# Patient Record
Sex: MALE | Race: OTHER | Employment: UNEMPLOYED | URBAN - METROPOLITAN AREA
[De-identification: names, ages, dates, MRNs, and addresses within clinical notes are randomized per-mention and may not be internally consistent; named-entity substitution may affect disease eponyms.]

---

## 2018-01-13 ENCOUNTER — OFFICE VISIT (OUTPATIENT)
Dept: INTERNAL MEDICINE CLINIC | Facility: CLINIC | Age: 23
End: 2018-01-13

## 2018-01-13 ENCOUNTER — TELEPHONE (OUTPATIENT)
Dept: INTERNAL MEDICINE CLINIC | Facility: CLINIC | Age: 23
End: 2018-01-13

## 2018-01-13 VITALS
DIASTOLIC BLOOD PRESSURE: 80 MMHG | WEIGHT: 191.5 LBS | RESPIRATION RATE: 22 BRPM | TEMPERATURE: 98 F | BODY MASS INDEX: 28.04 KG/M2 | HEIGHT: 69.25 IN | HEART RATE: 98 BPM | SYSTOLIC BLOOD PRESSURE: 124 MMHG

## 2018-01-13 DIAGNOSIS — Z00.00 ROUTINE GENERAL MEDICAL EXAMINATION AT A HEALTH CARE FACILITY: Primary | ICD-10-CM

## 2018-01-13 PROBLEM — F84.0 AUTISM SPECTRUM DISORDER: Status: ACTIVE | Noted: 2018-01-13

## 2018-01-13 PROBLEM — F90.2 ATTENTION DEFICIT HYPERACTIVITY DISORDER (ADHD), COMBINED TYPE: Status: ACTIVE | Noted: 2018-01-13

## 2018-01-13 PROBLEM — G24.9 DYSTONIA: Status: ACTIVE | Noted: 2018-01-13

## 2018-01-13 PROBLEM — E66.3 OVERWEIGHT (BMI 25.0-29.9): Status: ACTIVE | Noted: 2018-01-13

## 2018-01-13 PROBLEM — F84.0 AUTISM SPECTRUM DISORDER (HCC): Status: ACTIVE | Noted: 2018-01-13

## 2018-01-13 PROCEDURE — 99385 PREV VISIT NEW AGE 18-39: CPT | Performed by: INTERNAL MEDICINE

## 2018-01-13 RX ORDER — RISPERIDONE 2 MG/1
TABLET, FILM COATED ORAL
Refills: 6 | COMMUNITY
Start: 2017-12-10 | End: 2018-11-06 | Stop reason: ALTCHOICE

## 2018-01-13 RX ORDER — RISPERIDONE 3 MG/1
TABLET, FILM COATED ORAL
Refills: 6 | COMMUNITY
Start: 2017-12-10 | End: 2018-05-26

## 2018-01-13 RX ORDER — BENZTROPINE MESYLATE 1 MG/1
TABLET ORAL
Refills: 11 | COMMUNITY
Start: 2017-12-10 | End: 2018-05-26

## 2018-01-13 RX ORDER — DEXTROAMPHETAMINE SACCHARATE, AMPHETAMINE ASPARTATE MONOHYDRATE, DEXTROAMPHETAMINE SULFATE AND AMPHETAMINE SULFATE 3.75; 3.75; 3.75; 3.75 MG/1; MG/1; MG/1; MG/1
CAPSULE, EXTENDED RELEASE ORAL
Refills: 0 | COMMUNITY
Start: 2017-12-09 | End: 2018-04-04

## 2018-01-13 RX ORDER — CLONIDINE HYDROCHLORIDE 0.3 MG/1
0.3 TABLET ORAL
Refills: 6 | COMMUNITY
Start: 2017-12-10 | End: 2018-05-26

## 2018-01-13 NOTE — PROGRESS NOTES
Raimundo Srivastava is a 23year old male. HPI:   Patient presents for physical exam. Comes in with mother, twin borther, and maternal grandmother who is guardian. 1. ADHD: he feels he is focusing well.  Doing well with [employer] program.   2. Behavioral Disruptio pleasant  EXTREMITIES: no cyanosis, clubbing or edema    ASSESSMENT AND PLAN:   # ADHD: doing well on adderall  # Autism Spectrum Disorder: doing well, participating in [employer] program  # Insomnia: well controlled on nightly clonidine  # Behavioral Tery Norma

## 2018-01-13 NOTE — TELEPHONE ENCOUNTER
Forms were faxed to dr.ali dyer at [address] [place] [address] at [phone]     [facility] ([place], [place]) [phone]

## 2018-01-13 NOTE — PATIENT INSTRUCTIONS
You need 3 eight ounce servings of calcium/vitamin D daily. This includes spinach, kale, broccoli, almonds, milk, yogurt, or cottage cheese.

## 2018-01-18 ENCOUNTER — TELEPHONE (OUTPATIENT)
Dept: INTERNAL MEDICINE CLINIC | Facility: CLINIC | Age: 23
End: 2018-01-18

## 2018-01-18 NOTE — TELEPHONE ENCOUNTER
Received faxed records from [facility] - gave to LOUIS simms for dr

## 2018-01-29 ENCOUNTER — TELEPHONE (OUTPATIENT)
Dept: INTERNAL MEDICINE CLINIC | Facility: CLINIC | Age: 23
End: 2018-01-29

## 2018-01-29 NOTE — TELEPHONE ENCOUNTER
Medical records for [facility] have been received From [facility] and given to Dr. Sarah Enriquez

## 2018-04-04 ENCOUNTER — OFFICE VISIT (OUTPATIENT)
Dept: INTERNAL MEDICINE CLINIC | Facility: CLINIC | Age: 23
End: 2018-04-04

## 2018-04-04 VITALS
BODY MASS INDEX: 28.41 KG/M2 | WEIGHT: 194 LBS | SYSTOLIC BLOOD PRESSURE: 115 MMHG | OXYGEN SATURATION: 96 % | DIASTOLIC BLOOD PRESSURE: 80 MMHG | TEMPERATURE: 98 F | HEIGHT: 69.25 IN | HEART RATE: 86 BPM | RESPIRATION RATE: 15 BRPM

## 2018-04-04 DIAGNOSIS — F90.2 ATTENTION DEFICIT HYPERACTIVITY DISORDER (ADHD), COMBINED TYPE: ICD-10-CM

## 2018-04-04 DIAGNOSIS — F84.0 AUTISM SPECTRUM DISORDER: ICD-10-CM

## 2018-04-04 DIAGNOSIS — E66.3 OVERWEIGHT (BMI 25.0-29.9): Primary | ICD-10-CM

## 2018-04-04 PROCEDURE — 99213 OFFICE O/P EST LOW 20 MIN: CPT | Performed by: INTERNAL MEDICINE

## 2018-04-04 RX ORDER — DEXTROAMPHETAMINE SACCHARATE, AMPHETAMINE ASPARTATE MONOHYDRATE, DEXTROAMPHETAMINE SULFATE AND AMPHETAMINE SULFATE 3.75; 3.75; 3.75; 3.75 MG/1; MG/1; MG/1; MG/1
CAPSULE, EXTENDED RELEASE ORAL
Qty: 30 CAPSULE | Refills: 0 | Status: SHIPPED | OUTPATIENT
Start: 2018-04-04 | End: 2018-04-17

## 2018-04-04 NOTE — PROGRESS NOTES
Marian Primrose is a 23year old male. HPI:   Patient presents for medication refill. Comes in with twin borther, and maternal grandmother who is guardian. 1. ADHD: he feels he is focusing well.  Doing well with [employer] program. Has appt with psychiatry on A (Oral)   Resp 15   Ht 69.25\"   Wt 194 lb   SpO2 96%   BMI 28.44 kg/m²   GENERAL: A&O well developed, well nourished, in no apparent distress  SKIN: no rashes, no suspicious lesions  HEENT: atraumatic, MMM, throat is clear  NECK: supple, no jvd, no thyrome

## 2018-05-21 ENCOUNTER — EKG ENCOUNTER (OUTPATIENT)
Dept: LAB | Age: 23
End: 2018-05-21
Attending: NURSE PRACTITIONER
Payer: COMMERCIAL

## 2018-05-21 ENCOUNTER — APPOINTMENT (OUTPATIENT)
Dept: LAB | Age: 23
End: 2018-05-21
Attending: NURSE PRACTITIONER
Payer: COMMERCIAL

## 2018-05-21 DIAGNOSIS — Z51.81 MEDICATION MONITORING ENCOUNTER: ICD-10-CM

## 2018-05-21 PROCEDURE — 80053 COMPREHEN METABOLIC PANEL: CPT

## 2018-05-21 PROCEDURE — 80061 LIPID PANEL: CPT

## 2018-05-21 PROCEDURE — 93005 ELECTROCARDIOGRAM TRACING: CPT

## 2018-05-21 PROCEDURE — 85025 COMPLETE CBC W/AUTO DIFF WBC: CPT

## 2018-05-21 PROCEDURE — 93010 ELECTROCARDIOGRAM REPORT: CPT | Performed by: INTERNAL MEDICINE

## 2018-05-21 PROCEDURE — 36415 COLL VENOUS BLD VENIPUNCTURE: CPT

## 2018-05-22 NOTE — PROGRESS NOTES
CBC, CMP, Lipid panel, and EKG were reviewed and WNL. Results will be reviewed with patient and his guardian upon next appointment.

## 2018-10-15 ENCOUNTER — TELEPHONE (OUTPATIENT)
Dept: INTERNAL MEDICINE CLINIC | Facility: CLINIC | Age: 23
End: 2018-10-15

## 2018-10-15 NOTE — TELEPHONE ENCOUNTER
Patient's grandmother calling to speak for nurse please callback regarding questions about paperwork she would like to drop off.  Please callback to discuss further

## 2018-11-06 PROBLEM — F41.9 ANXIETY: Status: ACTIVE | Noted: 2018-11-06

## 2018-12-18 ENCOUNTER — IMMUNIZATION (OUTPATIENT)
Dept: INTERNAL MEDICINE CLINIC | Facility: CLINIC | Age: 23
End: 2018-12-18
Payer: COMMERCIAL

## 2018-12-18 DIAGNOSIS — Z23 NEED FOR VACCINATION: ICD-10-CM

## 2018-12-18 PROCEDURE — 90686 IIV4 VACC NO PRSV 0.5 ML IM: CPT | Performed by: INTERNAL MEDICINE

## 2018-12-18 PROCEDURE — 90471 IMMUNIZATION ADMIN: CPT | Performed by: INTERNAL MEDICINE

## 2019-05-23 ENCOUNTER — LAB ENCOUNTER (OUTPATIENT)
Dept: LAB | Age: 24
End: 2019-05-23
Attending: NURSE PRACTITIONER
Payer: COMMERCIAL

## 2019-05-23 DIAGNOSIS — F84.0 AUTISM SPECTRUM DISORDER: ICD-10-CM

## 2019-05-23 PROCEDURE — 36415 COLL VENOUS BLD VENIPUNCTURE: CPT

## 2019-05-23 PROCEDURE — 84443 ASSAY THYROID STIM HORMONE: CPT

## 2019-05-23 PROCEDURE — 80053 COMPREHEN METABOLIC PANEL: CPT

## 2019-05-23 PROCEDURE — 85025 COMPLETE CBC W/AUTO DIFF WBC: CPT

## 2019-05-23 PROCEDURE — 82306 VITAMIN D 25 HYDROXY: CPT

## 2019-05-23 PROCEDURE — 80061 LIPID PANEL: CPT

## 2019-05-23 PROCEDURE — 84439 ASSAY OF FREE THYROXINE: CPT

## 2019-05-23 NOTE — PROGRESS NOTES
Results reviewed. Raymond, please inform patient or his mother (he has autism) his vitamin D is significantly low and that may be the cause of his tiredness. I will send in a prescription strength of weekly vitamin D for him.

## 2019-10-28 ENCOUNTER — PATIENT MESSAGE (OUTPATIENT)
Dept: INTERNAL MEDICINE CLINIC | Facility: CLINIC | Age: 24
End: 2019-10-28

## 2019-10-28 DIAGNOSIS — T78.40XD ALLERGIC STATE, SUBSEQUENT ENCOUNTER: Primary | ICD-10-CM

## 2019-10-29 RX ORDER — EPINEPHRINE 0.3 MG/.3ML
0.3 INJECTION SUBCUTANEOUS ONCE
Status: CANCELLED | OUTPATIENT
Start: 2019-10-29 | End: 2019-10-29

## 2019-10-29 NOTE — TELEPHONE ENCOUNTER
From: Margaret Rodriguez  To: Ke Irvin MD  Sent: 10/28/2019 7:40 PM CDT  Subject: Prescription Question    Hi Dr. Chelsea Mercer's epi-pen has . Is it possible to send a prescription to the pharmacy?     Thanks,    Rena

## 2019-10-29 NOTE — TELEPHONE ENCOUNTER
I have not seen patient since April, 2018. We can send one epi-pen (please place order) but they need to make an OV for a CPX.

## 2019-10-30 RX ORDER — EPINEPHRINE 0.3 MG/.3ML
0.3 INJECTION SUBCUTANEOUS 2 TIMES DAILY PRN
Status: SHIPPED | OUTPATIENT
Start: 2019-10-30

## 2019-11-08 ENCOUNTER — IMMUNIZATION (OUTPATIENT)
Dept: INTERNAL MEDICINE CLINIC | Facility: CLINIC | Age: 24
End: 2019-11-08
Payer: COMMERCIAL

## 2019-11-08 DIAGNOSIS — Z23 NEED FOR VACCINATION: ICD-10-CM

## 2019-11-08 PROCEDURE — 90686 IIV4 VACC NO PRSV 0.5 ML IM: CPT | Performed by: INTERNAL MEDICINE

## 2019-11-08 PROCEDURE — 90471 IMMUNIZATION ADMIN: CPT | Performed by: INTERNAL MEDICINE

## 2020-06-11 ENCOUNTER — LABORATORY ENCOUNTER (OUTPATIENT)
Dept: LAB | Age: 25
End: 2020-06-11
Attending: NURSE PRACTITIONER
Payer: COMMERCIAL

## 2020-06-11 DIAGNOSIS — F84.0 AUTISM SPECTRUM DISORDER: ICD-10-CM

## 2020-06-11 DIAGNOSIS — E55.9 VITAMIN D DEFICIENCY: ICD-10-CM

## 2020-06-11 PROCEDURE — 82306 VITAMIN D 25 HYDROXY: CPT

## 2020-06-11 PROCEDURE — 84443 ASSAY THYROID STIM HORMONE: CPT

## 2020-06-11 PROCEDURE — 80061 LIPID PANEL: CPT

## 2020-06-11 PROCEDURE — 80053 COMPREHEN METABOLIC PANEL: CPT

## 2020-06-11 PROCEDURE — 36415 COLL VENOUS BLD VENIPUNCTURE: CPT

## 2020-06-11 PROCEDURE — 85025 COMPLETE CBC W/AUTO DIFF WBC: CPT

## 2020-10-01 ENCOUNTER — MED REC SCAN ONLY (OUTPATIENT)
Dept: INTERNAL MEDICINE CLINIC | Facility: CLINIC | Age: 25
End: 2020-10-01

## 2020-10-01 ENCOUNTER — IMMUNIZATION (OUTPATIENT)
Dept: INTERNAL MEDICINE CLINIC | Facility: CLINIC | Age: 25
End: 2020-10-01
Payer: COMMERCIAL

## 2020-10-01 DIAGNOSIS — Z23 NEED FOR VACCINATION: ICD-10-CM

## 2020-10-01 PROCEDURE — 90471 IMMUNIZATION ADMIN: CPT | Performed by: INTERNAL MEDICINE

## 2020-10-01 PROCEDURE — 90686 IIV4 VACC NO PRSV 0.5 ML IM: CPT | Performed by: INTERNAL MEDICINE

## 2021-01-25 ENCOUNTER — OFFICE VISIT (OUTPATIENT)
Dept: INTERNAL MEDICINE CLINIC | Facility: CLINIC | Age: 26
End: 2021-01-25
Payer: COMMERCIAL

## 2021-01-25 VITALS
SYSTOLIC BLOOD PRESSURE: 138 MMHG | TEMPERATURE: 98 F | HEART RATE: 108 BPM | DIASTOLIC BLOOD PRESSURE: 85 MMHG | BODY MASS INDEX: 33.27 KG/M2 | RESPIRATION RATE: 22 BRPM | HEIGHT: 69.5 IN | WEIGHT: 229.81 LBS | OXYGEN SATURATION: 99 %

## 2021-01-25 DIAGNOSIS — Z00.00 ROUTINE GENERAL MEDICAL EXAMINATION AT A HEALTH CARE FACILITY: Primary | ICD-10-CM

## 2021-01-25 PROCEDURE — 3008F BODY MASS INDEX DOCD: CPT | Performed by: INTERNAL MEDICINE

## 2021-01-25 PROCEDURE — 90715 TDAP VACCINE 7 YRS/> IM: CPT | Performed by: INTERNAL MEDICINE

## 2021-01-25 PROCEDURE — 90471 IMMUNIZATION ADMIN: CPT | Performed by: INTERNAL MEDICINE

## 2021-01-25 PROCEDURE — 3079F DIAST BP 80-89 MM HG: CPT | Performed by: INTERNAL MEDICINE

## 2021-01-25 PROCEDURE — 3075F SYST BP GE 130 - 139MM HG: CPT | Performed by: INTERNAL MEDICINE

## 2021-01-25 PROCEDURE — 99395 PREV VISIT EST AGE 18-39: CPT | Performed by: INTERNAL MEDICINE

## 2021-01-25 RX ORDER — ACETAMINOPHEN 160 MG
2000 TABLET,DISINTEGRATING ORAL DAILY
COMMUNITY

## 2021-01-25 NOTE — PROGRESS NOTES
Michael Ulloa is a 23year old male. HPI:   Patient presents for a physical exam. comse in with mom. They have no concerns. 1. Obese: weight is up 3 lbs from 2018.    24 hour dietary recall:  Breakfast yesterday - gogurt x 3 tubes, water  Lunch yesterd supple, no jvd, no thyromegaly  LUNGS: clear to auscultation bilateraly, no c/w/r  CARDIO: RRR without g/m/r  GI: soft non tender nondistended no hsm bs throughout  NEURO: CN 2-12 grossly intact  PSYCH: pleasant  EXTREMITIES: no cyanosis, clubbing or edema

## 2021-04-03 ENCOUNTER — IMMUNIZATION (OUTPATIENT)
Dept: LAB | Age: 26
End: 2021-04-03
Attending: HOSPITALIST
Payer: COMMERCIAL

## 2021-04-03 DIAGNOSIS — Z23 NEED FOR VACCINATION: Primary | ICD-10-CM

## 2021-04-03 PROCEDURE — 0001A SARSCOV2 VAC 30MCG/0.3ML IM: CPT

## 2021-04-24 ENCOUNTER — IMMUNIZATION (OUTPATIENT)
Dept: LAB | Age: 26
End: 2021-04-24
Attending: HOSPITALIST
Payer: COMMERCIAL

## 2021-04-24 DIAGNOSIS — Z23 NEED FOR VACCINATION: Primary | ICD-10-CM

## 2021-04-24 PROCEDURE — 0002A SARSCOV2 VAC 30MCG/0.3ML IM: CPT

## 2021-07-12 ENCOUNTER — LAB ENCOUNTER (OUTPATIENT)
Dept: LAB | Age: 26
End: 2021-07-12
Attending: NURSE PRACTITIONER
Payer: COMMERCIAL

## 2021-07-12 PROCEDURE — 36415 COLL VENOUS BLD VENIPUNCTURE: CPT | Performed by: NURSE PRACTITIONER

## 2021-07-12 PROCEDURE — 84443 ASSAY THYROID STIM HORMONE: CPT | Performed by: NURSE PRACTITIONER

## 2021-07-12 PROCEDURE — 80061 LIPID PANEL: CPT | Performed by: NURSE PRACTITIONER

## 2021-07-12 PROCEDURE — 85025 COMPLETE CBC W/AUTO DIFF WBC: CPT | Performed by: NURSE PRACTITIONER

## 2021-07-12 PROCEDURE — 83036 HEMOGLOBIN GLYCOSYLATED A1C: CPT | Performed by: NURSE PRACTITIONER

## 2021-07-12 PROCEDURE — 82306 VITAMIN D 25 HYDROXY: CPT | Performed by: NURSE PRACTITIONER

## 2021-07-12 PROCEDURE — 80053 COMPREHEN METABOLIC PANEL: CPT | Performed by: NURSE PRACTITIONER

## 2021-11-02 ENCOUNTER — IMMUNIZATION (OUTPATIENT)
Dept: INTERNAL MEDICINE CLINIC | Facility: CLINIC | Age: 26
End: 2021-11-02
Payer: COMMERCIAL

## 2021-11-02 DIAGNOSIS — Z23 NEED FOR VACCINATION: Primary | ICD-10-CM

## 2021-11-02 PROCEDURE — 90471 IMMUNIZATION ADMIN: CPT | Performed by: INTERNAL MEDICINE

## 2021-11-02 PROCEDURE — 90686 IIV4 VACC NO PRSV 0.5 ML IM: CPT | Performed by: INTERNAL MEDICINE

## 2021-12-29 ENCOUNTER — TELEPHONE (OUTPATIENT)
Dept: INTERNAL MEDICINE CLINIC | Facility: CLINIC | Age: 26
End: 2021-12-29

## 2021-12-29 ENCOUNTER — NURSE ONLY (OUTPATIENT)
Dept: LAB | Age: 26
End: 2021-12-29
Attending: INTERNAL MEDICINE
Payer: COMMERCIAL

## 2021-12-29 DIAGNOSIS — R50.9 FEVER, UNSPECIFIED FEVER CAUSE: ICD-10-CM

## 2021-12-29 DIAGNOSIS — R05.9 COUGH: Primary | ICD-10-CM

## 2021-12-29 DIAGNOSIS — R05.9 COUGH: ICD-10-CM

## 2021-12-29 NOTE — TELEPHONE ENCOUNTER
Pt's mother called and stated they got back from [place] on Monday the 12/27 and pt developed cough and fever for over 24 hours. Pt would like to get tested for covid. Please advise.

## 2022-01-01 LAB — SARS-COV-2 RNA RESP QL NAA+PROBE: DETECTED

## 2022-08-30 ENCOUNTER — TELEPHONE (OUTPATIENT)
Dept: INTERNAL MEDICINE CLINIC | Facility: CLINIC | Age: 27
End: 2022-08-30

## 2022-08-30 NOTE — TELEPHONE ENCOUNTER
KS - pt is requesting the following medication be removed from his active list. OK to remove?  Please advise, ty    Received the following from pt via [employer]:    Requested Medication Removals Start Date Reported By  Comments   Briviact 100 MG Tabs 5/21/2021 Ann Lindsay

## 2022-11-03 ENCOUNTER — IMMUNIZATION (OUTPATIENT)
Dept: INTERNAL MEDICINE CLINIC | Facility: CLINIC | Age: 27
End: 2022-11-03
Payer: COMMERCIAL

## 2022-11-03 DIAGNOSIS — Z23 NEED FOR VACCINATION: Primary | ICD-10-CM

## 2022-11-03 PROCEDURE — 90471 IMMUNIZATION ADMIN: CPT | Performed by: INTERNAL MEDICINE

## 2022-11-03 PROCEDURE — 90686 IIV4 VACC NO PRSV 0.5 ML IM: CPT | Performed by: INTERNAL MEDICINE

## 2022-12-03 ENCOUNTER — OFFICE VISIT (OUTPATIENT)
Dept: INTERNAL MEDICINE CLINIC | Facility: CLINIC | Age: 27
End: 2022-12-03
Payer: COMMERCIAL

## 2022-12-03 VITALS
BODY MASS INDEX: 33.74 KG/M2 | DIASTOLIC BLOOD PRESSURE: 100 MMHG | HEIGHT: 68.82 IN | WEIGHT: 227.81 LBS | HEART RATE: 102 BPM | OXYGEN SATURATION: 98 % | RESPIRATION RATE: 17 BRPM | TEMPERATURE: 99 F | SYSTOLIC BLOOD PRESSURE: 150 MMHG

## 2022-12-03 DIAGNOSIS — I10 PRIMARY HYPERTENSION: ICD-10-CM

## 2022-12-03 DIAGNOSIS — F90.2 ATTENTION DEFICIT HYPERACTIVITY DISORDER (ADHD), COMBINED TYPE: ICD-10-CM

## 2022-12-03 DIAGNOSIS — Z00.00 ROUTINE GENERAL MEDICAL EXAMINATION AT A HEALTH CARE FACILITY: Primary | ICD-10-CM

## 2022-12-03 DIAGNOSIS — F84.0 AUTISM SPECTRUM DISORDER: ICD-10-CM

## 2022-12-03 PROCEDURE — 3008F BODY MASS INDEX DOCD: CPT | Performed by: INTERNAL MEDICINE

## 2022-12-03 PROCEDURE — 3080F DIAST BP >= 90 MM HG: CPT | Performed by: INTERNAL MEDICINE

## 2022-12-03 PROCEDURE — 99395 PREV VISIT EST AGE 18-39: CPT | Performed by: INTERNAL MEDICINE

## 2022-12-03 PROCEDURE — 3077F SYST BP >= 140 MM HG: CPT | Performed by: INTERNAL MEDICINE

## 2022-12-03 RX ORDER — AMLODIPINE BESYLATE 5 MG/1
5 TABLET ORAL EVERY EVENING
Qty: 30 TABLET | Refills: 1 | Status: SHIPPED | OUTPATIENT
Start: 2022-12-03

## 2022-12-03 NOTE — PATIENT INSTRUCTIONS
I recommend you get the bivalent covid vaccine as soon as possible. Please start norvasc (amlodipine) 5 mg every evening. Please focus on eating healthy plant based nutrition which includes fresh vegetables, fresh fruits, lentils and legumes, and whole grains. Please bring your machine into your next office visit to ensure it is accurate. Please measure your blood pressure and pulse daily and record these values. Please measure your blood pressure two times in the morning and two times in the evening (1 minute apart) after when you have been relaxing for at least 5 minutes. Both feet should be flat on the ground and you should be seated. Please communicate your blood pressures to me in 14 days. Please call us if your blood pressure is greater than 130/80 on 3 occasions.

## 2022-12-17 ENCOUNTER — LAB ENCOUNTER (OUTPATIENT)
Dept: LAB | Age: 27
End: 2022-12-17
Attending: INTERNAL MEDICINE
Payer: COMMERCIAL

## 2022-12-17 DIAGNOSIS — Z00.00 ROUTINE GENERAL MEDICAL EXAMINATION AT A HEALTH CARE FACILITY: ICD-10-CM

## 2022-12-17 LAB
ALT SERPL-CCNC: 30 U/L
ANION GAP SERPL CALC-SCNC: 6 MMOL/L (ref 0–18)
AST SERPL-CCNC: 25 U/L (ref 15–37)
BUN BLD-MCNC: 10 MG/DL (ref 7–18)
CALCIUM BLD-MCNC: 9.4 MG/DL (ref 8.5–10.1)
CHLORIDE SERPL-SCNC: 105 MMOL/L (ref 98–112)
CHOLEST SERPL-MCNC: 157 MG/DL (ref ?–200)
CO2 SERPL-SCNC: 27 MMOL/L (ref 21–32)
CREAT BLD-MCNC: 1.14 MG/DL
EST. AVERAGE GLUCOSE BLD GHB EST-MCNC: 134 MG/DL (ref 68–126)
FASTING PATIENT LIPID ANSWER: NO
FASTING STATUS PATIENT QL REPORTED: NO
GFR SERPLBLD BASED ON 1.73 SQ M-ARVRAT: 92 ML/MIN/1.73M2 (ref 60–?)
GLUCOSE BLD-MCNC: 111 MG/DL (ref 70–99)
HBA1C MFR BLD: 6.3 % (ref ?–5.7)
HDLC SERPL-MCNC: 40 MG/DL (ref 40–59)
LDLC SERPL CALC-MCNC: 101 MG/DL (ref ?–100)
NONHDLC SERPL-MCNC: 117 MG/DL (ref ?–130)
OSMOLALITY SERPL CALC.SUM OF ELEC: 286 MOSM/KG (ref 275–295)
POTASSIUM SERPL-SCNC: 3.4 MMOL/L (ref 3.5–5.1)
SODIUM SERPL-SCNC: 138 MMOL/L (ref 136–145)
T4 FREE SERPL-MCNC: 1 NG/DL (ref 0.8–1.7)
TRIGL SERPL-MCNC: 83 MG/DL (ref 30–149)
TSI SER-ACNC: 1.75 MIU/ML (ref 0.36–3.74)
VLDLC SERPL CALC-MCNC: 14 MG/DL (ref 0–30)

## 2022-12-17 PROCEDURE — 80061 LIPID PANEL: CPT | Performed by: INTERNAL MEDICINE

## 2022-12-17 PROCEDURE — 84450 TRANSFERASE (AST) (SGOT): CPT | Performed by: INTERNAL MEDICINE

## 2022-12-17 PROCEDURE — 80048 BASIC METABOLIC PNL TOTAL CA: CPT | Performed by: INTERNAL MEDICINE

## 2022-12-17 PROCEDURE — 84439 ASSAY OF FREE THYROXINE: CPT

## 2022-12-17 PROCEDURE — 84443 ASSAY THYROID STIM HORMONE: CPT

## 2022-12-17 PROCEDURE — 36415 COLL VENOUS BLD VENIPUNCTURE: CPT | Performed by: INTERNAL MEDICINE

## 2022-12-17 PROCEDURE — 83036 HEMOGLOBIN GLYCOSYLATED A1C: CPT | Performed by: INTERNAL MEDICINE

## 2022-12-17 PROCEDURE — 84460 ALANINE AMINO (ALT) (SGPT): CPT | Performed by: INTERNAL MEDICINE

## 2022-12-27 RX ORDER — AMLODIPINE BESYLATE 10 MG/1
10 TABLET ORAL EVERY EVENING
Qty: 30 TABLET | Refills: 1 | COMMUNITY
Start: 2022-12-27

## 2023-02-27 RX ORDER — AMLODIPINE BESYLATE 10 MG/1
10 TABLET ORAL EVERY EVENING
Qty: 30 TABLET | Refills: 1 | Status: SHIPPED | OUTPATIENT
Start: 2023-02-27

## 2023-02-27 RX ORDER — AMLODIPINE BESYLATE 5 MG/1
TABLET ORAL
Qty: 30 TABLET | Refills: 0 | OUTPATIENT
Start: 2023-02-27

## 2023-03-08 ENCOUNTER — OFFICE VISIT (OUTPATIENT)
Dept: INTERNAL MEDICINE CLINIC | Facility: CLINIC | Age: 28
End: 2023-03-08
Payer: COMMERCIAL

## 2023-03-08 VITALS
HEIGHT: 68 IN | DIASTOLIC BLOOD PRESSURE: 84 MMHG | WEIGHT: 226.81 LBS | HEART RATE: 101 BPM | SYSTOLIC BLOOD PRESSURE: 132 MMHG | TEMPERATURE: 97 F | OXYGEN SATURATION: 99 % | BODY MASS INDEX: 34.37 KG/M2 | RESPIRATION RATE: 16 BRPM

## 2023-03-08 DIAGNOSIS — I10 PRIMARY HYPERTENSION: Primary | ICD-10-CM

## 2023-03-08 PROCEDURE — 99213 OFFICE O/P EST LOW 20 MIN: CPT | Performed by: INTERNAL MEDICINE

## 2023-03-08 PROCEDURE — 3079F DIAST BP 80-89 MM HG: CPT | Performed by: INTERNAL MEDICINE

## 2023-03-08 PROCEDURE — 3075F SYST BP GE 130 - 139MM HG: CPT | Performed by: INTERNAL MEDICINE

## 2023-03-08 PROCEDURE — 3008F BODY MASS INDEX DOCD: CPT | Performed by: INTERNAL MEDICINE

## 2023-03-08 RX ORDER — CLONIDINE HYDROCHLORIDE 0.3 MG/1
0.3 TABLET ORAL 2 TIMES DAILY
Qty: 180 TABLET | Refills: 1 | Status: SHIPPED | OUTPATIENT
Start: 2023-03-08

## 2023-03-08 NOTE — PATIENT INSTRUCTIONS
For your high blood pressure, please continue the amlodipine 10 mg every evening and INCREASE clonidine to 0.3 mg twice daily. Please check your blood pressure every day for 14 days and then send me the record.

## 2023-05-02 RX ORDER — AMLODIPINE BESYLATE 10 MG/1
10 TABLET ORAL EVERY EVENING
Qty: 90 TABLET | Refills: 0 | Status: SHIPPED | OUTPATIENT
Start: 2023-05-02

## 2023-06-12 ENCOUNTER — OFFICE VISIT (OUTPATIENT)
Dept: INTERNAL MEDICINE CLINIC | Facility: CLINIC | Age: 28
End: 2023-06-12
Payer: COMMERCIAL

## 2023-06-12 VITALS
HEART RATE: 100 BPM | DIASTOLIC BLOOD PRESSURE: 82 MMHG | SYSTOLIC BLOOD PRESSURE: 138 MMHG | OXYGEN SATURATION: 99 % | TEMPERATURE: 98 F | WEIGHT: 230.81 LBS | HEIGHT: 68.6 IN | RESPIRATION RATE: 16 BRPM | BODY MASS INDEX: 34.58 KG/M2

## 2023-06-12 DIAGNOSIS — E66.09 CLASS 1 OBESITY DUE TO EXCESS CALORIES WITH SERIOUS COMORBIDITY AND BODY MASS INDEX (BMI) OF 34.0 TO 34.9 IN ADULT: ICD-10-CM

## 2023-06-12 DIAGNOSIS — F90.2 ATTENTION DEFICIT HYPERACTIVITY DISORDER (ADHD), COMBINED TYPE: ICD-10-CM

## 2023-06-12 DIAGNOSIS — R73.01 IMPAIRED FASTING GLUCOSE: ICD-10-CM

## 2023-06-12 DIAGNOSIS — I10 PRIMARY HYPERTENSION: Primary | ICD-10-CM

## 2023-06-12 PROBLEM — E66.811 CLASS 1 OBESITY DUE TO EXCESS CALORIES WITH SERIOUS COMORBIDITY AND BODY MASS INDEX (BMI) OF 34.0 TO 34.9 IN ADULT: Status: ACTIVE | Noted: 2023-06-12

## 2023-06-12 PROBLEM — E66.3 OVERWEIGHT (BMI 25.0-29.9): Status: RESOLVED | Noted: 2018-01-13 | Resolved: 2023-06-12

## 2023-06-12 NOTE — PATIENT INSTRUCTIONS
Please start metformin as follows -   Week: one tablet with breakfast, Week 2: one tablet with breakfast and dinner, Week 3: 2 tablets with breakfast and 2 tablets with dinner for maintenance    Please do your labs 3-4 weeks after starting metformin. You do not need to fast.     Please bring your machine into your next office visit to ensure it is accurate. I like the OMRON brand. Please keep the receipt. Please measure your blood pressure and pulse daily and record these values. Please measure your blood pressure once daily after you have been resting for at least 5 minutes. Both feet should be flat on the ground and you should be seated with your back supported. Please communicate your blood pressures to me in 14 days.

## 2023-07-15 ENCOUNTER — LAB ENCOUNTER (OUTPATIENT)
Dept: LAB | Age: 28
End: 2023-07-15
Attending: INTERNAL MEDICINE
Payer: COMMERCIAL

## 2023-07-15 DIAGNOSIS — E66.09 CLASS 1 OBESITY DUE TO EXCESS CALORIES WITH SERIOUS COMORBIDITY AND BODY MASS INDEX (BMI) OF 34.0 TO 34.9 IN ADULT: ICD-10-CM

## 2023-07-15 DIAGNOSIS — R73.01 IMPAIRED FASTING GLUCOSE: ICD-10-CM

## 2023-07-15 DIAGNOSIS — F90.2 ATTENTION DEFICIT HYPERACTIVITY DISORDER (ADHD), COMBINED TYPE: ICD-10-CM

## 2023-07-15 DIAGNOSIS — I10 PRIMARY HYPERTENSION: ICD-10-CM

## 2023-07-15 LAB
ALT SERPL-CCNC: 35 U/L
ANION GAP SERPL CALC-SCNC: 6 MMOL/L (ref 0–18)
AST SERPL-CCNC: 23 U/L (ref 15–37)
BUN BLD-MCNC: 11 MG/DL (ref 7–18)
CALCIUM BLD-MCNC: 9.2 MG/DL (ref 8.5–10.1)
CHLORIDE SERPL-SCNC: 103 MMOL/L (ref 98–112)
CO2 SERPL-SCNC: 27 MMOL/L (ref 21–32)
CREAT BLD-MCNC: 1.15 MG/DL
EST. AVERAGE GLUCOSE BLD GHB EST-MCNC: 151 MG/DL (ref 68–126)
FASTING STATUS PATIENT QL REPORTED: YES
GFR SERPLBLD BASED ON 1.73 SQ M-ARVRAT: 91 ML/MIN/1.73M2 (ref 60–?)
GLUCOSE BLD-MCNC: 94 MG/DL (ref 70–99)
HBA1C MFR BLD: 6.9 % (ref ?–5.7)
OSMOLALITY SERPL CALC.SUM OF ELEC: 281 MOSM/KG (ref 275–295)
POTASSIUM SERPL-SCNC: 3.5 MMOL/L (ref 3.5–5.1)
SODIUM SERPL-SCNC: 136 MMOL/L (ref 136–145)

## 2023-07-15 PROCEDURE — 80048 BASIC METABOLIC PNL TOTAL CA: CPT

## 2023-07-15 PROCEDURE — 84460 ALANINE AMINO (ALT) (SGPT): CPT

## 2023-07-15 PROCEDURE — 83036 HEMOGLOBIN GLYCOSYLATED A1C: CPT

## 2023-07-15 PROCEDURE — 84450 TRANSFERASE (AST) (SGOT): CPT

## 2023-07-15 PROCEDURE — 36415 COLL VENOUS BLD VENIPUNCTURE: CPT

## 2023-07-15 PROCEDURE — 3044F HG A1C LEVEL LT 7.0%: CPT | Performed by: INTERNAL MEDICINE

## 2023-08-11 RX ORDER — AMLODIPINE BESYLATE 10 MG/1
10 TABLET ORAL EVERY EVENING
Qty: 90 TABLET | Refills: 0 | Status: SHIPPED | OUTPATIENT
Start: 2023-08-11

## 2023-08-11 NOTE — TELEPHONE ENCOUNTER
Dr. Buddy Reardon pt    Amlodipine 10 mg  Filled 5-2-23  Qty 90  0 refills  Future Appointments   Date Time Provider [facility]   8/11/2023  7:00 PM CLAYTON Rosen [facility]   9/13/2023  1:00 PM Ignacia Velez MD [facility] [place]   LOV 6-12-23 [place]  RTC 3 mo.

## 2023-09-20 RX ORDER — AMLODIPINE BESYLATE 10 MG/1
10 TABLET ORAL EVERY EVENING
Qty: 90 TABLET | Refills: 0 | Status: SHIPPED | OUTPATIENT
Start: 2023-09-20

## 2023-09-20 NOTE — TELEPHONE ENCOUNTER
Amlodipine 10 mg  Filled 8-11-23  Qty 90  0 refills  Future Appointments   Date Time Provider Sagar Hwang   9/29/2023  9:00 AM Nahomy Bradley MD [facility] [place]   11/17/2023  6:00 PM CLAYTON Daniels [facility]   LOV 6-12-23 [place]  RTC 3 mo.   Labs 7-15-23 BMP

## 2023-09-21 RX ORDER — AMLODIPINE BESYLATE 10 MG/1
10 TABLET ORAL EVERY EVENING
Qty: 90 TABLET | Refills: 0 | OUTPATIENT
Start: 2023-09-21

## 2023-09-26 ENCOUNTER — TELEPHONE (OUTPATIENT)
Dept: INTERNAL MEDICINE CLINIC | Facility: CLINIC | Age: 28
End: 2023-09-26

## 2023-09-26 NOTE — TELEPHONE ENCOUNTER
Pts mother called in regards to pt turning 34    Moms insurance is asking for a letter from the MD for proof that child is on the [address] and is not able to work and obtain his own insurance    Mom stated due date is for this Friday 9/29/23    Future Appointments   Date Time Provider Sagar Hwang   9/29/2023  9:00 AM Reyna Becker MD [facility] [place]

## 2023-09-29 ENCOUNTER — OFFICE VISIT (OUTPATIENT)
Dept: INTERNAL MEDICINE CLINIC | Facility: CLINIC | Age: 28
End: 2023-09-29
Payer: COMMERCIAL

## 2023-09-29 VITALS
TEMPERATURE: 98 F | HEIGHT: 68.6 IN | HEART RATE: 102 BPM | SYSTOLIC BLOOD PRESSURE: 130 MMHG | OXYGEN SATURATION: 98 % | BODY MASS INDEX: 34.04 KG/M2 | WEIGHT: 227.19 LBS | DIASTOLIC BLOOD PRESSURE: 80 MMHG | RESPIRATION RATE: 18 BRPM

## 2023-09-29 DIAGNOSIS — E11.59 HYPERTENSION ASSOCIATED WITH DIABETES: ICD-10-CM

## 2023-09-29 DIAGNOSIS — E11.9 TYPE 2 DIABETES MELLITUS WITHOUT COMPLICATION, WITHOUT LONG-TERM CURRENT USE OF INSULIN (HCC): Primary | ICD-10-CM

## 2023-09-29 DIAGNOSIS — I15.2 HYPERTENSION ASSOCIATED WITH DIABETES: ICD-10-CM

## 2023-09-29 DIAGNOSIS — F90.2 ATTENTION DEFICIT HYPERACTIVITY DISORDER (ADHD), COMBINED TYPE: ICD-10-CM

## 2023-09-29 DIAGNOSIS — E66.09 CLASS 1 OBESITY DUE TO EXCESS CALORIES WITH SERIOUS COMORBIDITY AND BODY MASS INDEX (BMI) OF 34.0 TO 34.9 IN ADULT: ICD-10-CM

## 2023-09-29 PROBLEM — I10 PRIMARY HYPERTENSION: Status: RESOLVED | Noted: 2022-12-03 | Resolved: 2023-09-29

## 2023-09-29 PROBLEM — R73.01 IMPAIRED FASTING GLUCOSE: Status: RESOLVED | Noted: 2023-06-12 | Resolved: 2023-09-29

## 2023-09-29 PROCEDURE — 3079F DIAST BP 80-89 MM HG: CPT | Performed by: INTERNAL MEDICINE

## 2023-09-29 PROCEDURE — 3008F BODY MASS INDEX DOCD: CPT | Performed by: INTERNAL MEDICINE

## 2023-09-29 PROCEDURE — 3075F SYST BP GE 130 - 139MM HG: CPT | Performed by: INTERNAL MEDICINE

## 2023-09-29 PROCEDURE — 99214 OFFICE O/P EST MOD 30 MIN: CPT | Performed by: INTERNAL MEDICINE

## 2023-09-29 NOTE — PATIENT INSTRUCTIONS
I recommend the following vaccines -  Prevnar 20 as soon as possible. Annual flu shot every September, October. Stay up to date with COVID vaccines. Please increase metformin to 1000 mg twice daily with meals. Please repeat your labs and urine testing in January, 2024.  You do not need to fast.

## 2023-10-31 ENCOUNTER — IMMUNIZATION (OUTPATIENT)
Dept: INTERNAL MEDICINE CLINIC | Facility: CLINIC | Age: 28
End: 2023-10-31

## 2023-10-31 DIAGNOSIS — Z23 NEED FOR VACCINATION: Primary | ICD-10-CM

## 2023-10-31 PROCEDURE — 90686 IIV4 VACC NO PRSV 0.5 ML IM: CPT | Performed by: INTERNAL MEDICINE

## 2023-10-31 PROCEDURE — 90471 IMMUNIZATION ADMIN: CPT | Performed by: INTERNAL MEDICINE

## 2024-01-26 RX ORDER — AMLODIPINE BESYLATE 10 MG/1
10 TABLET ORAL EVERY EVENING
Qty: 90 TABLET | Refills: 0 | OUTPATIENT
Start: 2024-01-26

## 2024-01-26 RX ORDER — AMLODIPINE BESYLATE 10 MG/1
10 TABLET ORAL EVERY EVENING
Qty: 90 TABLET | Refills: 0 | Status: SHIPPED | OUTPATIENT
Start: 2024-01-26

## 2024-01-26 NOTE — TELEPHONE ENCOUNTER
Protocol PASS    Requesting: amLODIPine 10 MG Oral Tab     LOV: 9/29/23  RTC: 4 months  Filled: 9/20/23 90 tablet 0 refill  Recent Labs: 7/15/23    Upcoming OV   Future Appointments   Date Time Provider Department Center   2/23/2024  6:30 PM Anny Espinoza APRN [facility]   3/18/2024  8:30 AM Lucy Donald MD [facility] [place]

## 2024-03-18 ENCOUNTER — TELEMEDICINE (OUTPATIENT)
Dept: INTERNAL MEDICINE CLINIC | Facility: CLINIC | Age: 29
End: 2024-03-18
Payer: MEDICAID

## 2024-03-18 VITALS — BODY MASS INDEX: 33 KG/M2 | WEIGHT: 220 LBS

## 2024-03-18 DIAGNOSIS — F90.2 ATTENTION DEFICIT HYPERACTIVITY DISORDER (ADHD), COMBINED TYPE: ICD-10-CM

## 2024-03-18 DIAGNOSIS — E66.9 TYPE 2 DIABETES MELLITUS WITH OBESITY (HCC): ICD-10-CM

## 2024-03-18 DIAGNOSIS — E66.09 CLASS 1 OBESITY DUE TO EXCESS CALORIES WITH SERIOUS COMORBIDITY AND BODY MASS INDEX (BMI) OF 34.0 TO 34.9 IN ADULT: ICD-10-CM

## 2024-03-18 DIAGNOSIS — I15.2 HYPERTENSION ASSOCIATED WITH DIABETES (HCC): ICD-10-CM

## 2024-03-18 DIAGNOSIS — E11.9 TYPE 2 DIABETES MELLITUS WITHOUT COMPLICATION, WITHOUT LONG-TERM CURRENT USE OF INSULIN (HCC): Primary | ICD-10-CM

## 2024-03-18 DIAGNOSIS — E11.59 HYPERTENSION ASSOCIATED WITH DIABETES (HCC): ICD-10-CM

## 2024-03-18 DIAGNOSIS — E11.69 TYPE 2 DIABETES MELLITUS WITH OBESITY (HCC): ICD-10-CM

## 2024-03-18 PROCEDURE — 99214 OFFICE O/P EST MOD 30 MIN: CPT | Performed by: INTERNAL MEDICINE

## 2024-03-18 RX ORDER — BRIVARACETAM 50 MG/1
50 TABLET, FILM COATED ORAL 2 TIMES DAILY
COMMUNITY
Start: 2023-10-19 | End: 2024-03-18

## 2024-03-18 RX ORDER — AMLODIPINE BESYLATE 10 MG/1
10 TABLET ORAL EVERY EVENING
Qty: 90 TABLET | Refills: 0 | Status: SHIPPED | OUTPATIENT
Start: 2024-03-18

## 2024-03-18 RX ORDER — LACOSAMIDE 200 MG/1
TABLET ORAL
COMMUNITY
Start: 2023-10-10 | End: 2024-03-18

## 2024-03-18 NOTE — PROGRESS NOTES
Ruslan Lafleur is a 28 year old male.   Virtual Telephone Check-In    This visit is conducted using Telemedicine with live, interactive video and audio.  Patient understands and accepts financial responsibility for any deductible, co-insurance and/or co-pays associated with this service.    Telehealth outside of [facility]  Telehealth Verbal Consent   I conducted a telehealth visit with ANASTASIIA on 3/18/2024 which was completed using two-way, real-time interactive audio and video communication. This has been done in good abilio to provide continuity of care in the best interest of the provider-patient relationship, due to the COVID -19 public health crisis/national emergency where restrictions of face-to-face office visits are ongoing. Every conscious effort was taken to allow for sufficient and adequate time to complete the visit.  The patient was made aware of the limitations of the telehealth visit, including treatment limitations as no physical exam could be performed.  The patient was advised to call 911 or to go to the ER in case there was an emergency.  The patient was also advised of the potential privacy & security concerns related to the telehealth platform.   The patient was made aware of where to find [facility]'s notice of privacy practices, telehealth consent form and other related consent forms and documents.  which are located on the [facility] website. The patient verbally agreed to telehealth consent form, related consents and the risks discussed.    Lastly, the patient confirmed that they were in [place].   Included in this visit, time may have been spent reviewing labs, medications, radiology tests and decision making. Appropriate medical decision-making and tests are ordered as detailed in the plan of care above.  Coding/billing information is submitted for this visit based on complexity of care and/or time spent for the visit.  Time spent:: 21 minutes    HPI:   Patient presents for DM. MomRena is also  present  DM - we increased metformin to 1000 mg BID 6 months ago. He is not checking blood sugars.   HTN - he does not check his pressures at home.   Weight management  - he has lost 12 lbs. He attributes this to metformin. Still eating a lot of mac and cheese, pizza, nuggets, and french fries. He tries to walks in the house daily.     REVIEW OF SYSTEMS:   GENERAL HEALTH: feels well otherwise. No f/c  NEURO: denies any headaches, LH, dizzyness, LOC, falls  VISION: denies any blurred or double vision  RESPIRATORY: denies shortness of breath, cough, or congestion  CARDIOVASCULAR: denies chest pain, pressure or palpitations  GI: denies abdominal pain, constipation, diarrhea, n/v  : no dysuria or hematuria  PSYCH: mood is stable. Denies depression or anxiety  EXT: denies edema       Wt Readings from Last 6 Encounters:   09/29/23 227 lb 3.2 oz (103.1 kg)   06/12/23 230 lb 12.8 oz (104.7 kg)   03/08/23 226 lb 12.8 oz (102.9 kg)   12/03/22 227 lb 12.8 oz (103.3 kg)   01/25/21 229 lb 12.8 oz (104.2 kg)   04/04/18 194 lb (88 kg)       Allergies   Allergen Reactions    Fish-Derived Products     Oats, Oat Gum     Peanut-Containing Drug Products UNKNOWN     All nuts       Family History   Problem Relation Age of Onset    Hypertension Father     Diabetes Mother     Heart Disorder Maternal Grandmother         chf    Cancer Paternal Grandmother     Other (twin brother) Brother     Other (adhd) Other     Other (autism) Other       Past Medical History:   Diagnosis Date    Asthma (HCC)     Headache disorder       Past Surgical History:   Procedure Laterality Date    OTHER SURGICAL HISTORY      circum age 3mo      Social History:    Social History     Socioeconomic History    Marital status: Single   Tobacco Use    Smoking status: Never    Smokeless tobacco: Never   Substance and Sexual Activity    Alcohol use: No    Drug use: No           EXAM:   Wt 220 lb (99.8 kg)   BMI 32.87 kg/m²   GENERAL: A&O well developed, well  nourished,in no apparent distress  HEENT: atraumatic  LUNGS: speaking clearly and easily in complete sentences  NEURO: facial gestures grossly intact  PSYCH: pleasant    ASSESSMENT AND PLAN:   # Diabetes: overdue for labs. Tolerating metformin well. I would like to start GLP-1 to help with appetite. Family is open to mounjaro or trulicity  - DM eye exam done on 3/11/2024 without retinopathy by Shaina Contreras optometrist.   - Foot Exam: done 2023  - LDL: discuss ate age 42  - BP: see below  - micro alb:creat ordered  - Depression Screen: done 2024   - no indication for ASA at this time  # Obese, 34 in DM:  risperidone stimulates his appetite. Metformin is helping but still needs to improve his nutrition. Hopefully can start GLP-1.   Counseled on healthy plant based nutrition, regular exercise, and practicing mindfulness. We outlined small, achievable goals.    # HTN associated with DM- Near goal on norvasc and clonidine.   # ADHD: doing well on adderall .   # Autism Spectrum Disorder: doing well  # Insomnia: well controlled on nightly clonidine  # Behavioral Disruption: well controlled with risperidone, cogentin for dystonia   # Health Maintenance: CPX on 12/3/2022  Stress Management: feels he tiffany well with stress  Indication for ASA (50-60 yo): no need for ASA  Colon Cancer Screening: no FHx. Screen at age 45  Bone Health/Fall Prevention (Kimani Chi): educated on dietary calcium/vit D, weight bearing exercises  Prostate Cancer Screening: discuss screening at age 45  Vaccines: Get annual flu shot and covid vaccines.  TDAP 2021    The patient indicates understanding of these issues and agrees to the plan.  The patient is asked to return in 3 months for physical exam and DM, HTN.   Lucy Donald MD

## 2024-04-08 ENCOUNTER — LAB ENCOUNTER (OUTPATIENT)
Dept: LAB | Age: 29
End: 2024-04-08
Attending: INTERNAL MEDICINE
Payer: MEDICAID

## 2024-04-08 DIAGNOSIS — E11.59 HYPERTENSION ASSOCIATED WITH DIABETES (HCC): ICD-10-CM

## 2024-04-08 DIAGNOSIS — E11.9 TYPE 2 DIABETES MELLITUS WITHOUT COMPLICATION, WITHOUT LONG-TERM CURRENT USE OF INSULIN (HCC): ICD-10-CM

## 2024-04-08 DIAGNOSIS — I15.2 HYPERTENSION ASSOCIATED WITH DIABETES (HCC): ICD-10-CM

## 2024-04-08 LAB
ALT SERPL-CCNC: 43 U/L
ANION GAP SERPL CALC-SCNC: 7 MMOL/L (ref 0–18)
AST SERPL-CCNC: 30 U/L (ref ?–34)
BUN BLD-MCNC: 9 MG/DL (ref 9–23)
BUN/CREAT SERPL: 8.6 (ref 10–20)
CALCIUM BLD-MCNC: 9.7 MG/DL (ref 8.7–10.4)
CHLORIDE SERPL-SCNC: 101 MMOL/L (ref 98–112)
CHOLEST SERPL-MCNC: 179 MG/DL (ref ?–200)
CO2 SERPL-SCNC: 29 MMOL/L (ref 21–32)
CREAT BLD-MCNC: 1.05 MG/DL
EGFRCR SERPLBLD CKD-EPI 2021: 100 ML/MIN/1.73M2 (ref 60–?)
EST. AVERAGE GLUCOSE BLD GHB EST-MCNC: 137 MG/DL (ref 68–126)
FASTING PATIENT LIPID ANSWER: YES
FASTING STATUS PATIENT QL REPORTED: YES
GLUCOSE BLD-MCNC: 104 MG/DL (ref 70–99)
HBA1C MFR BLD: 6.4 % (ref ?–5.7)
HDLC SERPL-MCNC: 42 MG/DL (ref 40–59)
LDLC SERPL CALC-MCNC: 122 MG/DL (ref ?–100)
NONHDLC SERPL-MCNC: 137 MG/DL (ref ?–130)
OSMOLALITY SERPL CALC.SUM OF ELEC: 283 MOSM/KG (ref 275–295)
POTASSIUM SERPL-SCNC: 3.6 MMOL/L (ref 3.5–5.1)
SODIUM SERPL-SCNC: 137 MMOL/L (ref 136–145)
TRIGL SERPL-MCNC: 82 MG/DL (ref 30–149)
VLDLC SERPL CALC-MCNC: 14 MG/DL (ref 0–30)

## 2024-04-08 PROCEDURE — 80048 BASIC METABOLIC PNL TOTAL CA: CPT

## 2024-04-08 PROCEDURE — 83036 HEMOGLOBIN GLYCOSYLATED A1C: CPT

## 2024-04-08 PROCEDURE — 84460 ALANINE AMINO (ALT) (SGPT): CPT

## 2024-04-08 PROCEDURE — 84450 TRANSFERASE (AST) (SGOT): CPT

## 2024-04-08 PROCEDURE — 80061 LIPID PANEL: CPT

## 2024-04-08 PROCEDURE — 36415 COLL VENOUS BLD VENIPUNCTURE: CPT

## 2024-07-03 ENCOUNTER — OFFICE VISIT (OUTPATIENT)
Dept: INTERNAL MEDICINE CLINIC | Facility: CLINIC | Age: 29
End: 2024-07-03
Payer: MEDICAID

## 2024-07-03 VITALS
HEIGHT: 68.6 IN | BODY MASS INDEX: 33.41 KG/M2 | HEART RATE: 95 BPM | WEIGHT: 223 LBS | RESPIRATION RATE: 18 BRPM | OXYGEN SATURATION: 100 % | SYSTOLIC BLOOD PRESSURE: 130 MMHG | DIASTOLIC BLOOD PRESSURE: 100 MMHG

## 2024-07-03 DIAGNOSIS — I15.2 HYPERTENSION ASSOCIATED WITH DIABETES (HCC): ICD-10-CM

## 2024-07-03 DIAGNOSIS — E11.59 HYPERTENSION ASSOCIATED WITH DIABETES (HCC): ICD-10-CM

## 2024-07-03 DIAGNOSIS — E11.69 TYPE 2 DIABETES MELLITUS WITH OBESITY (HCC): ICD-10-CM

## 2024-07-03 DIAGNOSIS — E11.9 TYPE 2 DIABETES MELLITUS WITHOUT COMPLICATION, WITHOUT LONG-TERM CURRENT USE OF INSULIN (HCC): ICD-10-CM

## 2024-07-03 DIAGNOSIS — E66.9 TYPE 2 DIABETES MELLITUS WITH OBESITY (HCC): ICD-10-CM

## 2024-07-03 DIAGNOSIS — Z00.00 ROUTINE GENERAL MEDICAL EXAMINATION AT A HEALTH CARE FACILITY: Primary | ICD-10-CM

## 2024-07-03 PROBLEM — E66.811 CLASS 1 OBESITY DUE TO EXCESS CALORIES WITH SERIOUS COMORBIDITY AND BODY MASS INDEX (BMI) OF 34.0 TO 34.9 IN ADULT: Status: RESOLVED | Noted: 2023-06-12 | Resolved: 2024-07-03

## 2024-07-03 PROBLEM — F41.9 ANXIETY: Status: RESOLVED | Noted: 2018-11-06 | Resolved: 2024-07-03

## 2024-07-03 PROBLEM — E66.09 CLASS 1 OBESITY DUE TO EXCESS CALORIES WITH SERIOUS COMORBIDITY AND BODY MASS INDEX (BMI) OF 34.0 TO 34.9 IN ADULT: Status: RESOLVED | Noted: 2023-06-12 | Resolved: 2024-07-03

## 2024-07-03 PROCEDURE — 99395 PREV VISIT EST AGE 18-39: CPT | Performed by: INTERNAL MEDICINE

## 2024-07-03 RX ORDER — AMLODIPINE BESYLATE 10 MG/1
10 TABLET ORAL EVERY EVENING
Qty: 90 TABLET | Refills: 3 | Status: SHIPPED | OUTPATIENT
Start: 2024-07-03

## 2024-07-03 NOTE — PATIENT INSTRUCTIONS
I recommend the following vaccines -  Annual FLU every September, October.    Stay up to date with COVID vaccines.     Please measure your blood pressure and pulse daily and record these values. Please measure your blood pressure once daily after you have been resting for at least 5 minutes. Both feet should be flat on the ground and you should be seated with your back supported. Please communicate your blood pressures to me in 14 days.     Please complete your labs in October, 2024.

## 2024-07-03 NOTE — PROGRESS NOTES
Ruslan Lafleur is a 28 year old male.     HPI:   Patient presents for DM and wellness exam. Comes in with grandma and brother  Weight management - he is practicing ruma. Nutrition is mac and cheese, chicken nuggets, pizza. Mainly drinks soda, refreshers, gatorade. No real change in appetite since starting metformin. He does not like many veggies and fruit. Family is trying to add veggies in.   DM - he does not check his sugars.   HTN - does not check pressures at home.       REVIEW OF SYSTEMS:   GENERAL HEALTH: feels well otherwise. No f/c  NEURO: denies any headaches, LH, dizzyness, LOC, falls  VISION: denies any blurred or double vision  RESPIRATORY: denies shortness of breath, cough, or congestion  CARDIOVASCULAR: denies chest pain, pressure or palpitations  GI: denies abdominal pain, constipation, diarrhea, n/v, BRBPR, melena  : no dysuria , or hematuria  SKIN: denies any unusual skin lesions or rashes  PSYCH: mood is stable. Denies depression or anxiety.   EXT: denies edema       Wt Readings from Last 6 Encounters:   03/18/24 220 lb (99.8 kg)   09/29/23 227 lb 3.2 oz (103.1 kg)   06/12/23 230 lb 12.8 oz (104.7 kg)   03/08/23 226 lb 12.8 oz (102.9 kg)   12/03/22 227 lb 12.8 oz (103.3 kg)   01/25/21 229 lb 12.8 oz (104.2 kg)       Allergies   Allergen Reactions    Fish-Derived Products     Oats, Oat Gum     Peanut-Containing Drug Products UNKNOWN     All nuts       Family History   Problem Relation Age of Onset    Hypertension Father     Diabetes Mother     Heart Disorder Maternal Grandmother         chf    Cancer Paternal Grandmother     Other (twin brother) Brother     Other (adhd) Other     Other (autism) Other       Past Medical History:    Asthma (HCC)    Headache disorder      Past Surgical History:   Procedure Laterality Date    Other surgical history      circum age 3mo      Social History:    Social History     Socioeconomic History    Marital status: Single   Tobacco Use    Smoking status: Never     Smokeless tobacco: Never   Substance and Sexual Activity    Alcohol use: No    Drug use: No           EXAM:   BP (!) 130/100 (BP Location: Left arm)   Pulse 95   Resp 18   Ht 5' 8.6\" (1.742 m)   Wt 223 lb (101.2 kg)   SpO2 100%   BMI 33.32 kg/m²   GENERAL: A&O well developed, well nourished,in no apparent distress  SKIN: no rashes,no suspicious lesions  HEENT: atraumatic, MMM, throat is clear  NECK: supple, no jvd, no thyromegaly  LUNGS: clear to auscultation bilateraly, no c/w/r  CARDIO: RRR without g/m/r  GI: soft non tender nondistended no hsm bs throughout  NEURO: CN 2-12 grossly intact  PSYCH: pleasant  EXTREMITIES: no cyanosis, clubbing or edema  Bilateral barefoot skin diabetic exam is normal, visualized feet and the appearance is normal.  Bilateral monofilament/sensation of both feet is normal.  Pulsation pedal pulse exam of both lower legs/feet is normal as well.        ASSESSMENT AND PLAN:   # Diabetes: A1C controlled in 4/2024. Tolerating metformin well.   - DM eye exam done on 3/11/2024 without retinopathy by Shaina Contreras optometrist.   - Foot Exam: done 2024   - LDL: discuss ate age 42  - BP: see below  - micro alb:creat ordered   - Depression Screen: done 2024   - no indication for ASA at this time  # Obese, 33 in DM:  risperidone stimulates his appetite. Metformin is helping but still needs to improve his nutrition. They want to hold off on adding GLP-1   Counseled on healthy plant based nutrition, regular exercise, and practicing mindfulness. We outlined small, achievable goals.    # HTN associated with DM- slightly high but he is agitated/excited. Going to start checking home pressures. cont on norvasc and clonidine.   # ADHD: doing well on adderall .   # Autism Spectrum Disorder: doing well  # Insomnia: well controlled on nightly clonidine  # Behavioral Disruption: well controlled with risperidone, cogentin for dystonia   # Health Maintenance: CPX on 7/3/2024  Stress Management: feels he  tiffany well with stress  Indication for ASA (50-58 yo): no need for ASA  Colon Cancer Screening: no FHx. Screen at age 45  Bone Health/Fall Prevention (Kimani Chi): educated on dietary calcium/vit D, weight bearing exercises  Prostate Cancer Screening: discuss screening at age 45  Vaccines: Get annual flu shot and covid vaccines.  TDAP 2021    The patient indicates understanding of these issues and agrees to the plan.  The patient is asked to return in 6 months for weight, DM and HTN  Lucy Donald MD

## 2024-07-08 ENCOUNTER — TELEPHONE (OUTPATIENT)
Dept: INTERNAL MEDICINE CLINIC | Facility: CLINIC | Age: 29
End: 2024-07-08

## 2024-07-08 NOTE — TELEPHONE ENCOUNTER
Patient's mother came into office to drop off Disabled Dependent Authorization form to be completed by Dr. Donald. Placed in fax folder for review.

## 2024-07-10 ENCOUNTER — PATIENT MESSAGE (OUTPATIENT)
Dept: INTERNAL MEDICINE CLINIC | Facility: CLINIC | Age: 29
End: 2024-07-10

## 2024-07-11 NOTE — TELEPHONE ENCOUNTER
From: Ruslan Lafleur  To: Lucy Donald  Sent: 7/10/2024 10:21 AM CDT  Subject: Forms    Hello-    I dropped off the wrong form for Dr. Donald to complete. The correct form is attached.     Thanks

## 2024-07-15 NOTE — TELEPHONE ENCOUNTER
Forms ready at  for pickup  Spoke to Mom/Chapis LEE sent     Copy sent to scan  Copy placed in pod 2 accordion \"P\"

## 2024-10-08 ENCOUNTER — IMMUNIZATION (OUTPATIENT)
Dept: INTERNAL MEDICINE CLINIC | Facility: CLINIC | Age: 29
End: 2024-10-08
Payer: MEDICAID

## 2024-10-08 DIAGNOSIS — Z23 NEED FOR VACCINATION: Primary | ICD-10-CM

## 2024-10-08 PROCEDURE — 90471 IMMUNIZATION ADMIN: CPT | Performed by: INTERNAL MEDICINE

## 2024-10-08 PROCEDURE — 90656 IIV3 VACC NO PRSV 0.5 ML IM: CPT | Performed by: INTERNAL MEDICINE

## 2024-10-17 ENCOUNTER — PATIENT MESSAGE (OUTPATIENT)
Dept: INTERNAL MEDICINE CLINIC | Facility: CLINIC | Age: 29
End: 2024-10-17

## 2025-02-04 ENCOUNTER — LAB ENCOUNTER (OUTPATIENT)
Dept: LAB | Age: 30
End: 2025-02-04
Attending: INTERNAL MEDICINE
Payer: COMMERCIAL

## 2025-02-04 DIAGNOSIS — E11.69 TYPE 2 DIABETES MELLITUS WITH OBESITY (HCC): ICD-10-CM

## 2025-02-04 DIAGNOSIS — E11.59 HYPERTENSION ASSOCIATED WITH DIABETES (HCC): ICD-10-CM

## 2025-02-04 DIAGNOSIS — E11.9 TYPE 2 DIABETES MELLITUS WITHOUT COMPLICATION, WITHOUT LONG-TERM CURRENT USE OF INSULIN (HCC): ICD-10-CM

## 2025-02-04 DIAGNOSIS — E66.9 TYPE 2 DIABETES MELLITUS WITH OBESITY (HCC): ICD-10-CM

## 2025-02-04 DIAGNOSIS — F84.0 AUTISM SPECTRUM DISORDER (HCC): ICD-10-CM

## 2025-02-04 DIAGNOSIS — I15.2 HYPERTENSION ASSOCIATED WITH DIABETES (HCC): ICD-10-CM

## 2025-02-04 DIAGNOSIS — F39 UNSPECIFIED MOOD (AFFECTIVE) DISORDER: ICD-10-CM

## 2025-02-04 LAB
ALT SERPL-CCNC: 24 U/L
ANION GAP SERPL CALC-SCNC: 11 MMOL/L (ref 0–18)
AST SERPL-CCNC: 27 U/L (ref ?–34)
BUN BLD-MCNC: 11 MG/DL (ref 9–23)
CALCIUM BLD-MCNC: 9.6 MG/DL (ref 8.7–10.6)
CHLORIDE SERPL-SCNC: 100 MMOL/L (ref 98–112)
CO2 SERPL-SCNC: 28 MMOL/L (ref 21–32)
CREAT BLD-MCNC: 1.32 MG/DL
EGFRCR SERPLBLD CKD-EPI 2021: 76 ML/MIN/1.73M2 (ref 60–?)
EST. AVERAGE GLUCOSE BLD GHB EST-MCNC: 148 MG/DL (ref 68–126)
FASTING STATUS PATIENT QL REPORTED: YES
GLUCOSE BLD-MCNC: 95 MG/DL (ref 70–99)
HBA1C MFR BLD: 6.8 % (ref ?–5.7)
OSMOLALITY SERPL CALC.SUM OF ELEC: 287 MOSM/KG (ref 275–295)
POTASSIUM SERPL-SCNC: 3.6 MMOL/L (ref 3.5–5.1)
SODIUM SERPL-SCNC: 139 MMOL/L (ref 136–145)
VIT D+METAB SERPL-MCNC: 12.6 NG/ML (ref 30–100)

## 2025-02-04 PROCEDURE — 82306 VITAMIN D 25 HYDROXY: CPT

## 2025-02-04 PROCEDURE — 80048 BASIC METABOLIC PNL TOTAL CA: CPT

## 2025-02-04 PROCEDURE — 84460 ALANINE AMINO (ALT) (SGPT): CPT

## 2025-02-04 PROCEDURE — 36415 COLL VENOUS BLD VENIPUNCTURE: CPT

## 2025-02-04 PROCEDURE — 84450 TRANSFERASE (AST) (SGOT): CPT

## 2025-02-04 PROCEDURE — 83036 HEMOGLOBIN GLYCOSYLATED A1C: CPT

## 2025-02-04 NOTE — PROGRESS NOTES
Results reviewed. Please inform patient's mother his vitamin d is really low and I will send in a prescription dose weekly. She should f/u with pcp after this is done for recheck.

## 2025-02-21 ENCOUNTER — LAB ENCOUNTER (OUTPATIENT)
Dept: LAB | Age: 30
End: 2025-02-21
Attending: INTERNAL MEDICINE
Payer: COMMERCIAL

## 2025-02-21 ENCOUNTER — OFFICE VISIT (OUTPATIENT)
Dept: INTERNAL MEDICINE CLINIC | Facility: CLINIC | Age: 30
End: 2025-02-21
Payer: COMMERCIAL

## 2025-02-21 VITALS
DIASTOLIC BLOOD PRESSURE: 100 MMHG | SYSTOLIC BLOOD PRESSURE: 150 MMHG | TEMPERATURE: 98 F | WEIGHT: 232 LBS | HEIGHT: 68.6 IN | BODY MASS INDEX: 34.76 KG/M2 | OXYGEN SATURATION: 96 % | HEART RATE: 121 BPM

## 2025-02-21 DIAGNOSIS — I15.2 HYPERTENSION ASSOCIATED WITH DIABETES (HCC): ICD-10-CM

## 2025-02-21 DIAGNOSIS — E11.9 TYPE 2 DIABETES MELLITUS WITHOUT COMPLICATION, WITHOUT LONG-TERM CURRENT USE OF INSULIN (HCC): Primary | ICD-10-CM

## 2025-02-21 DIAGNOSIS — R00.0 TACHYCARDIA: ICD-10-CM

## 2025-02-21 DIAGNOSIS — E11.69 TYPE 2 DIABETES MELLITUS WITH OBESITY (HCC): ICD-10-CM

## 2025-02-21 DIAGNOSIS — E11.59 HYPERTENSION ASSOCIATED WITH DIABETES (HCC): ICD-10-CM

## 2025-02-21 DIAGNOSIS — F90.2 ATTENTION DEFICIT HYPERACTIVITY DISORDER (ADHD), COMBINED TYPE: ICD-10-CM

## 2025-02-21 DIAGNOSIS — E11.9 TYPE 2 DIABETES MELLITUS WITHOUT COMPLICATION, WITHOUT LONG-TERM CURRENT USE OF INSULIN (HCC): ICD-10-CM

## 2025-02-21 DIAGNOSIS — E66.9 TYPE 2 DIABETES MELLITUS WITH OBESITY (HCC): ICD-10-CM

## 2025-02-21 LAB
ATRIAL RATE: 104 BPM
BASOPHILS # BLD AUTO: 0.04 X10(3) UL (ref 0–0.2)
BASOPHILS NFR BLD AUTO: 0.6 %
CREAT UR-SCNC: 138.2 MG/DL
EOSINOPHIL # BLD AUTO: 0.14 X10(3) UL (ref 0–0.7)
EOSINOPHIL NFR BLD AUTO: 2.1 %
ERYTHROCYTE [DISTWIDTH] IN BLOOD BY AUTOMATED COUNT: 12.4 %
HCT VFR BLD AUTO: 43.1 %
HGB BLD-MCNC: 14.3 G/DL
IMM GRANULOCYTES # BLD AUTO: 0.01 X10(3) UL (ref 0–1)
IMM GRANULOCYTES NFR BLD: 0.2 %
LYMPHOCYTES # BLD AUTO: 2.94 X10(3) UL (ref 1–4)
LYMPHOCYTES NFR BLD AUTO: 45.1 %
MCH RBC QN AUTO: 28.5 PG (ref 26–34)
MCHC RBC AUTO-ENTMCNC: 33.2 G/DL (ref 31–37)
MCV RBC AUTO: 85.9 FL
MICROALBUMIN UR-MCNC: 2.9 MG/DL
MICROALBUMIN/CREAT 24H UR-RTO: 21 UG/MG (ref ?–30)
MONOCYTES # BLD AUTO: 0.42 X10(3) UL (ref 0.1–1)
MONOCYTES NFR BLD AUTO: 6.4 %
NEUTROPHILS # BLD AUTO: 2.97 X10 (3) UL (ref 1.5–7.7)
NEUTROPHILS # BLD AUTO: 2.97 X10(3) UL (ref 1.5–7.7)
NEUTROPHILS NFR BLD AUTO: 45.6 %
P AXIS: 65 DEGREES
P-R INTERVAL: 142 MS
PLATELET # BLD AUTO: 365 10(3)UL (ref 150–450)
Q-T INTERVAL: 340 MS
QRS DURATION: 84 MS
QTC CALCULATION (BEZET): 447 MS
R AXIS: 78 DEGREES
RBC # BLD AUTO: 5.02 X10(6)UL
T AXIS: -37 DEGREES
T4 FREE SERPL-MCNC: 1.2 NG/DL (ref 0.8–1.7)
TSI SER-ACNC: 1.41 UIU/ML (ref 0.55–4.78)
VENTRICULAR RATE: 104 BPM
WBC # BLD AUTO: 6.5 X10(3) UL (ref 4–11)

## 2025-02-21 PROCEDURE — 84443 ASSAY THYROID STIM HORMONE: CPT

## 2025-02-21 PROCEDURE — 93000 ELECTROCARDIOGRAM COMPLETE: CPT | Performed by: INTERNAL MEDICINE

## 2025-02-21 PROCEDURE — 82570 ASSAY OF URINE CREATININE: CPT

## 2025-02-21 PROCEDURE — 99214 OFFICE O/P EST MOD 30 MIN: CPT | Performed by: INTERNAL MEDICINE

## 2025-02-21 PROCEDURE — 84439 ASSAY OF FREE THYROXINE: CPT

## 2025-02-21 PROCEDURE — 82043 UR ALBUMIN QUANTITATIVE: CPT

## 2025-02-21 PROCEDURE — 36415 COLL VENOUS BLD VENIPUNCTURE: CPT

## 2025-02-21 PROCEDURE — 85025 COMPLETE CBC W/AUTO DIFF WBC: CPT

## 2025-02-21 RX ORDER — TIRZEPATIDE 5 MG/.5ML
5 INJECTION, SOLUTION SUBCUTANEOUS WEEKLY
Qty: 2 ML | Refills: 0 | Status: SHIPPED | OUTPATIENT
Start: 2025-02-21

## 2025-02-21 RX ORDER — TIRZEPATIDE 2.5 MG/.5ML
2.5 INJECTION, SOLUTION SUBCUTANEOUS WEEKLY
Qty: 2 ML | Refills: 0 | Status: SHIPPED | OUTPATIENT
Start: 2025-02-21

## 2025-02-21 NOTE — PATIENT INSTRUCTIONS
For your diabetes, please STOP metformin and start weekly Mounjaro injections as follows:  Week 1 through 4 take 2.5 mg weekly. Week 5 through 8 take 5 mg weekly. We continue on this dose until we see how much weight you are losing.     You are due for your diabetic eye exam in March, 2025.       Please bring your machine into your next office visit to ensure it is accurate. I like the OMRON brand. Please keep the receipt.   Please measure your blood pressure and pulse daily and record these values. Please measure your blood pressure once daily after you have been resting for at least 5 minutes. Both feet should be flat on the ground and you should be seated with your back supported. Please communicate your blood pressures to me in 14 days.     I recommend the following vaccines -  Annual FLU every September, October.    Stay up to date with COVID vaccines.

## 2025-02-21 NOTE — PROGRESS NOTES
Ruslan Lafleur is a 29 year old male.     HPI:   Patient presents for DM. Comes in with grandma Toyin and brother.   DM eye exam - aware he is due next month.   DM urine testing - due.   Weight management - he is gaining weight. Grandmother states he is eating too much cake and hot popcorn. He is also eating large portions. Eats a small variety of foods. No exercise. Grandma is requesting GLP-1.   HTN - states he is anxious today.     REVIEW OF SYSTEMS:   GENERAL HEALTH: feels well otherwise. No f/c  NEURO: denies any  LH, dizzyness, LOC, falls. Toyin notices he has headaches after eating too much or something with sodium.   VISION: denies any blurred or double vision  RESPIRATORY: denies shortness of breath, cough, or congestion  CARDIOVASCULAR: denies chest pain, pressure or palpitations  GI: denies abdominal pain, constipation, diarrhea, n/v, BRBPR, melena  : no dysuria or hematuria  PSYCH: mood is stable. Denies depression or anxiety.   EXT: denies edema       Wt Readings from Last 6 Encounters:   07/03/24 223 lb (101.2 kg)   03/18/24 220 lb (99.8 kg)   09/29/23 227 lb 3.2 oz (103.1 kg)   06/12/23 230 lb 12.8 oz (104.7 kg)   03/08/23 226 lb 12.8 oz (102.9 kg)   12/03/22 227 lb 12.8 oz (103.3 kg)       Allergies   Allergen Reactions    Fish-Derived Products     Oats, Oat Gum     Peanut-Containing Drug Products UNKNOWN     All nuts       Family History   Problem Relation Age of Onset    Hypertension Father     Diabetes Mother     Heart Disorder Maternal Grandmother         chf    Cancer Paternal Grandmother     Other (twin brother) Brother     Other (adhd) Other     Other (autism) Other       Past Medical History:    Asthma (HCC)    Headache disorder      Past Surgical History:   Procedure Laterality Date    Other surgical history      circum age 3mo      Social History:    Social History     Socioeconomic History    Marital status: Single   Tobacco Use    Smoking status: Never    Smokeless tobacco: Never    Substance and Sexual Activity    Alcohol use: No    Drug use: No           EXAM:   BP (!) 150/100 (BP Location: Left arm)   Pulse (!) 121   Temp 98.2 °F (36.8 °C) (Temporal)   Ht 5' 8.6\" (1.742 m)   Wt 232 lb (105.2 kg)   SpO2 96%   BMI 34.66 kg/m²   GENERAL: A&O well developed, well nourished,in no apparent distress  SKIN: no rashes,no suspicious lesions  HEENT: atraumatic, MMM, throat is clear  NECK: supple, no jvd, no thyromegaly  LUNGS: clear to auscultation bilateraly, no c/w/r  CARDIO: tachy, reg rhythm, without g/m/r  GI: soft non tender nondistended no hsm bs throughout  NEURO: CN 2-12 grossly intact  PSYCH: pleasant  EXTREMITIES: no cyanosis, clubbing or edema  Bilateral barefoot skin diabetic exam is normal, visualized feet and the appearance is normal.  Bilateral monofilament/sensation of both feet is normal.  Pulsation pedal pulse exam of both lower legs/feet is normal as well.    ASSESSMENT AND PLAN:   # Tachycardia - EKG today with rate of 104, LVH and TW changes likely due to rapid rate. Check CBC and TSH now. I will reach out to Anny Espinoza about his adderall and decreasing or stopping it.   # Diabetes: A1C controlled in 2/2025. Metformin is not helping him lose weight. Change to GLP-1. Grandmother will give him injections.   - DM eye exam done on 3/11/2024 without retinopathy by Shaina Contreras optometrist.   - Foot Exam: done 2025   - LDL: discuss at age 42  - BP: see below  - micro alb:creat ordered   - Depression Screen: done 2025   - no indication for ASA at this time  # Obese, 34 in DM:  risperidone stimulates his appetite. He continues to gain weight. Metformin is not helping. Agreeable to GLP-1. Side effects discussed.   # HTN associated with DM - they really need to start checking home pressures so I can decide how to adjust his medications. BP is not controlled today. Adderall may be playing a role.   # ADHD: adderall may be leading to cardiac issues.   # Autism Spectrum Disorder:  doing well  # Insomnia: well controlled on nightly clonidine  # Behavioral Disruption: well controlled with risperidone, cogentin for dystonia   # Health Maintenance: CPX on 7/3/2024  Stress Management: feels he tiffany well with stress  Indication for ASA (50-58 yo): no need for ASA  Colon Cancer Screening: no FHx. Screen at age 45  Bone Health/Fall Prevention (Kimani Chi): educated on dietary calcium/vit D, weight bearing exercises  Prostate Cancer Screening: discuss screening at age 45  Vaccines: Get annual flu shot and covid vaccines.  TDAP 2021    The patient indicates understanding of these issues and agrees to the plan.  The patient is asked to return in July, 2025 for physical exam.   Lucy Donald MD

## 2025-02-22 NOTE — PROGRESS NOTES
Please notify mother or grandmaToyin of the following -  His labs are normal  I spoke with Anny Espinzoa, his psychiatrist, and she agrees with stopping this adderal to see if that helps improve his blood pressures and pulse  Please ask mom/grandma to make sure to start checking his blood pressure and pulse regularly and ensure he is taking his norvasc and clonidine daily

## 2025-02-24 ENCOUNTER — TELEPHONE (OUTPATIENT)
Dept: INTERNAL MEDICINE CLINIC | Facility: CLINIC | Age: 30
End: 2025-02-24

## 2025-02-24 NOTE — TELEPHONE ENCOUNTER
We will have to keep him off adderall until his blood pressures and pulse normalize. Anny Espinoza, his psychiatrist, and I will decide if/when it is safe to resume. TY

## 2025-02-24 NOTE — TELEPHONE ENCOUNTER
Attempted to call patient's mother, unable to reach. Left message to call back.    Spoke with patient's grandmother, Toyin, OK per verbal release. Pt's grandmother states that patient has been off of adderall since Saturday. Pt does have an appointment with Anny Espinoza this evening. Toyin states that Anny had increased adderall to 25mg for a year, the original dose was 15mg.    This RN relayed to grandmother to make sure to check his BP once a day and that he is taking his norvasc and clonidine daily.  Pt's grandmother v/u.     KS: Pt's grandmother wondering how long should patient be off adderall for?     Author: Lucy Donald MD Service: -- Author Type: Physician   Filed: 2/21/2025 10:49 PM Encounter Date: 2/21/2025 Status: Signed   : Lucy Donald MD (Physician)     Please notify mother or grandmaToyin of the following -  His labs are normal  I spoke with Anny Espinoza, his psychiatrist, and she agrees with stopping this adderal to see if that helps improve his blood pressures and pulse  Please ask mom/grandma to make sure to start checking his blood pressure and pulse regularly and ensure he is taking his norvasc and clonidine daily

## 2025-02-24 NOTE — TELEPHONE ENCOUNTER
Spoke with patient's grandmother, Toyin, to inform of Dr. Donald's recommendations. Pt's grandmother v/u. No further questions/concerns.

## 2025-04-15 NOTE — TELEPHONE ENCOUNTER
Protocol failed    LOV: 2/21/25  RTC: July 2025  Filled: 2/21/25 #2mL   Labs: 2/4/25   Future Appointments   Date Time Provider Department Center   6/2/2025  7:00 PM Anny Espinoza APRN [facility]

## 2025-04-16 RX ORDER — TIRZEPATIDE 5 MG/.5ML
INJECTION, SOLUTION SUBCUTANEOUS
Qty: 2 ML | Refills: 0 | Status: SHIPPED | OUTPATIENT
Start: 2025-04-16

## 2025-05-15 ENCOUNTER — TELEPHONE (OUTPATIENT)
Dept: INTERNAL MEDICINE CLINIC | Facility: CLINIC | Age: 30
End: 2025-05-15

## 2025-05-15 ENCOUNTER — PATIENT MESSAGE (OUTPATIENT)
Dept: INTERNAL MEDICINE CLINIC | Facility: CLINIC | Age: 30
End: 2025-05-15

## 2025-05-15 NOTE — TELEPHONE ENCOUNTER
LOV: 2/21/2025 with Dr. Donald  RTC: 5 months  Last Relevant Labs: February 2025  Filled: 4/16/2025    #2 mL with 0 refills    Future Appointments   Date Time Provider Department Center   6/2/2025  7:00 PM Alexis, Anny, APRN [facility]

## 2025-05-16 RX ORDER — TIRZEPATIDE 5 MG/.5ML
5 INJECTION, SOLUTION SUBCUTANEOUS WEEKLY
Qty: 2 ML | Refills: 0 | Status: SHIPPED | OUTPATIENT
Start: 2025-05-16

## 2025-05-16 NOTE — TELEPHONE ENCOUNTER
See MCM below--    Hi Dr. Donald-     I just weighed Ruslan and he’s 213lbs. He’s lost 19lbs since Feb and that’s great! I think we can hold at 5mg for the next month to see how he does.     Thanks,     Rena

## 2025-05-16 NOTE — TELEPHONE ENCOUNTER
I sent family an MCM asking if he is losing weight on medication and whether we should increase his dose.

## 2025-06-17 ENCOUNTER — TELEPHONE (OUTPATIENT)
Dept: INTERNAL MEDICINE CLINIC | Facility: CLINIC | Age: 30
End: 2025-06-17

## 2025-06-17 NOTE — TELEPHONE ENCOUNTER
PA for mounjaro 7.gmg/0.5ml auto-injectors submitted through cover my meds     Key:[id number]    Awaiting payer response

## 2025-06-18 NOTE — TELEPHONE ENCOUNTER
Called pharmacy   No PA required   Medication has to be ran under patients primary insurance [employer]

## 2025-07-13 DIAGNOSIS — E66.9 TYPE 2 DIABETES MELLITUS WITH OBESITY (HCC): ICD-10-CM

## 2025-07-13 DIAGNOSIS — E11.69 TYPE 2 DIABETES MELLITUS WITH OBESITY (HCC): ICD-10-CM

## 2025-07-13 DIAGNOSIS — R79.89 HIGH SERUM VITAMIN D: ICD-10-CM

## 2025-07-13 DIAGNOSIS — E11.59 HYPERTENSION ASSOCIATED WITH DIABETES (HCC): Primary | ICD-10-CM

## 2025-07-13 DIAGNOSIS — I15.2 HYPERTENSION ASSOCIATED WITH DIABETES (HCC): Primary | ICD-10-CM

## 2025-07-13 DIAGNOSIS — E55.9 VITAMIN D DEFICIENCY: ICD-10-CM

## 2025-07-13 DIAGNOSIS — E11.9 TYPE 2 DIABETES MELLITUS WITHOUT COMPLICATION, WITHOUT LONG-TERM CURRENT USE OF INSULIN (HCC): ICD-10-CM

## 2025-07-14 RX ORDER — TIRZEPATIDE 7.5 MG/.5ML
7.5 INJECTION, SOLUTION SUBCUTANEOUS WEEKLY
Qty: 2 ML | Refills: 0 | Status: SHIPPED | OUTPATIENT
Start: 2025-07-14

## 2025-07-14 NOTE — TELEPHONE ENCOUNTER
Requesting:   Tirzepatide (MOUNJARO) 7.5 MG/0.5ML Subcutaneous Solution Auto-injector         Sig: Inject 7.5 mg into the skin once a week. Replaces 5 mg dose    Disp: 2 mL    Refills: 0     Protocol: passed     LOV:02/21/2025  RTC:5 months (due)  Labs:02/21/2025  Last filled:06/16/2025  No future appointments.

## 2025-08-11 RX ORDER — TIRZEPATIDE 7.5 MG/.5ML
7.5 INJECTION, SOLUTION SUBCUTANEOUS WEEKLY
Qty: 2 ML | Refills: 0 | OUTPATIENT
Start: 2025-08-11

## 2025-08-15 ENCOUNTER — LAB ENCOUNTER (OUTPATIENT)
Dept: LAB | Age: 30
End: 2025-08-15
Attending: INTERNAL MEDICINE

## 2025-08-15 DIAGNOSIS — I15.2 HYPERTENSION ASSOCIATED WITH DIABETES (HCC): ICD-10-CM

## 2025-08-15 DIAGNOSIS — E55.9 VITAMIN D DEFICIENCY: ICD-10-CM

## 2025-08-15 DIAGNOSIS — E11.69 TYPE 2 DIABETES MELLITUS WITH OBESITY (HCC): ICD-10-CM

## 2025-08-15 DIAGNOSIS — E11.59 HYPERTENSION ASSOCIATED WITH DIABETES (HCC): ICD-10-CM

## 2025-08-15 DIAGNOSIS — E66.9 TYPE 2 DIABETES MELLITUS WITH OBESITY (HCC): ICD-10-CM

## 2025-08-15 DIAGNOSIS — E11.9 TYPE 2 DIABETES MELLITUS WITHOUT COMPLICATION, WITHOUT LONG-TERM CURRENT USE OF INSULIN (HCC): ICD-10-CM

## 2025-08-15 LAB
ALT SERPL-CCNC: 22 U/L (ref 10–49)
ANION GAP SERPL CALC-SCNC: 11 MMOL/L (ref 0–18)
AST SERPL-CCNC: 24 U/L (ref ?–34)
BUN BLD-MCNC: 11 MG/DL (ref 9–23)
CALCIUM BLD-MCNC: 9.3 MG/DL (ref 8.7–10.6)
CHLORIDE SERPL-SCNC: 102 MMOL/L (ref 98–112)
CHOLEST SERPL-MCNC: 162 MG/DL (ref ?–200)
CO2 SERPL-SCNC: 28 MMOL/L (ref 21–32)
CREAT BLD-MCNC: 1.26 MG/DL (ref 0.7–1.3)
EGFRCR SERPLBLD CKD-EPI 2021: 80 ML/MIN/1.73M2 (ref 60–?)
EST. AVERAGE GLUCOSE BLD GHB EST-MCNC: 117 MG/DL (ref 68–126)
FASTING PATIENT LIPID ANSWER: YES
FASTING STATUS PATIENT QL REPORTED: YES
GLUCOSE BLD-MCNC: 85 MG/DL (ref 70–99)
HBA1C MFR BLD: 5.7 % (ref ?–5.7)
HDLC SERPL-MCNC: 46 MG/DL (ref 40–59)
LDLC SERPL CALC-MCNC: 105 MG/DL (ref ?–100)
NONHDLC SERPL-MCNC: 116 MG/DL (ref ?–130)
OSMOLALITY SERPL CALC.SUM OF ELEC: 291 MOSM/KG (ref 275–295)
POTASSIUM SERPL-SCNC: 3.9 MMOL/L (ref 3.5–5.1)
SODIUM SERPL-SCNC: 141 MMOL/L (ref 136–145)
TRIGL SERPL-MCNC: 52 MG/DL (ref 30–149)
VIT D+METAB SERPL-MCNC: 20.6 NG/ML (ref 30–100)
VLDLC SERPL CALC-MCNC: 9 MG/DL (ref 0–30)

## 2025-08-15 PROCEDURE — 83036 HEMOGLOBIN GLYCOSYLATED A1C: CPT

## 2025-08-15 PROCEDURE — 84460 ALANINE AMINO (ALT) (SGPT): CPT

## 2025-08-15 PROCEDURE — 36415 COLL VENOUS BLD VENIPUNCTURE: CPT

## 2025-08-15 PROCEDURE — 80061 LIPID PANEL: CPT

## 2025-08-15 PROCEDURE — 80048 BASIC METABOLIC PNL TOTAL CA: CPT

## 2025-08-15 PROCEDURE — 84450 TRANSFERASE (AST) (SGOT): CPT

## 2025-08-15 PROCEDURE — 82306 VITAMIN D 25 HYDROXY: CPT

## 2025-08-17 ENCOUNTER — RESULTS FOLLOW-UP (OUTPATIENT)
Dept: LAB | Age: 30
End: 2025-08-17

## 2025-08-18 RX ORDER — TIRZEPATIDE 7.5 MG/.5ML
7.5 INJECTION, SOLUTION SUBCUTANEOUS WEEKLY
Qty: 6 ML | Refills: 3 | Status: SHIPPED | OUTPATIENT
Start: 2025-08-18

## (undated) NOTE — Clinical Note
Gal Mccormick,  I saw Ruslan today and he is tachycardic and hypertensive. He is asymptomatic and EKG showed sinus tach. I know he has been stable on adderall 25 mg for more than 1 year but I was wondering if this could be contributing.  I am checking a CBC and TSH as well.  Kindly, Lucy

## (undated) NOTE — LETTER
23  RE: Ramu Petties  : 1995      To Whom It May Concern,    Patient is under my care. He has Autism.     Mina Mijares,    Dr. Rickey Mednes

## (undated) NOTE — LETTER
Patient Name: Danielle Mar  : 1995  MRN: [id number]  Patient Address: [place] [address]      Coronavirus Disease 2019 (COVID-19)     [place] [address] is committed to the safety and well-being of our patients, members, carefully. If your symptoms get worse, call your healthcare provider immediately. 3. Get rest and stay hydrated.    4. If you have a medical appointment, call the healthcare provider ahead of time and tell them that you have or may have COVID-19.  5. For m of fever-reducing medications; and  · Improvement in respiratory symptoms (e.g., cough, shortness of breath); and  · At least 10 days have passed since symptoms first appeared OR if asymptomatic patient or date of symptom onset is unclear then use 10 days donors must:    · Have had a confirmed diagnosis of COVID-19  · Be symptom-free for at least 14 days*    *Some people will be required to have a repeat COVID-19 test in order to be eligible to donate.  If you’re instructed by [facility] that a repeat test is r random. Researchers are trying to identify similarities between people with a Post-COVID condition to better understand if there are risk factors. How do I prevent a Post-COVID condition?   The best way to prevent the long-term symptoms of COVID-19 is